# Patient Record
Sex: FEMALE | Race: WHITE | URBAN - METROPOLITAN AREA
[De-identification: names, ages, dates, MRNs, and addresses within clinical notes are randomized per-mention and may not be internally consistent; named-entity substitution may affect disease eponyms.]

---

## 2016-11-15 LAB
HPV ABSTRACT: NORMAL
PAP-ABSTRACT: NORMAL

## 2017-01-03 ENCOUNTER — OFFICE VISIT (OUTPATIENT)
Dept: OBGYN | Facility: CLINIC | Age: 32
End: 2017-01-03
Payer: COMMERCIAL

## 2017-01-03 VITALS
TEMPERATURE: 98 F | OXYGEN SATURATION: 99 % | DIASTOLIC BLOOD PRESSURE: 72 MMHG | HEIGHT: 70 IN | SYSTOLIC BLOOD PRESSURE: 110 MMHG | BODY MASS INDEX: 30.92 KG/M2 | WEIGHT: 216 LBS | HEART RATE: 74 BPM

## 2017-01-03 DIAGNOSIS — B97.7 HIGH RISK HPV INFECTION: Primary | ICD-10-CM

## 2017-01-03 DIAGNOSIS — Z11.51 SCREENING FOR HUMAN PAPILLOMAVIRUS: ICD-10-CM

## 2017-01-03 PROCEDURE — 87624 HPV HI-RISK TYP POOLED RSLT: CPT | Mod: 90 | Performed by: OBSTETRICS & GYNECOLOGY

## 2017-01-03 PROCEDURE — 88175 CYTOPATH C/V AUTO FLUID REDO: CPT | Mod: 90 | Performed by: OBSTETRICS & GYNECOLOGY

## 2017-01-03 PROCEDURE — 99203 OFFICE O/P NEW LOW 30 MIN: CPT | Performed by: OBSTETRICS & GYNECOLOGY

## 2017-01-03 PROCEDURE — 99000 SPECIMEN HANDLING OFFICE-LAB: CPT | Performed by: OBSTETRICS & GYNECOLOGY

## 2017-01-03 RX ORDER — ETONOGESTREL/ETHINYL ESTRADIOL .12-.015MG
RING, VAGINAL VAGINAL
COMMUNITY
Start: 2016-12-08

## 2017-01-03 NOTE — PATIENT INSTRUCTIONS
You can reach your [facility] Care Team any time of the day by calling [phone]. This number will put you in touch with the 24 hour nurse line if the clinic is closed.    To contact your OB/GYN Surgery Scheduler please call [phone]. This is a direct number for your care team between 8 a.m. and 4 p.m. Monday through Friday.    [facility] is open for your convenience: [phone]  Monday through Friday 8 a.m. to 8:30 p.m.  Saturday 9 a.m. to 6 p.m.  Sunday Noon to 6 p.m.    They are closed on all major holidays.

## 2017-01-03 NOTE — NURSING NOTE
"Chief Complaint   Patient presents with     Gyn Exam     pap the hpv        HPV positive    Initial /72 mmHg  Pulse 74  Temp(Src) 98  F (36.7  C) (Oral)  Ht 5' 10\" (1.778 m)  Wt 216 lb (97.977 kg)  BMI 30.99 kg/m2  SpO2 99%  LMP 2016 (Exact Date) Estimated body mass index is 30.99 kg/(m^2) as calculated from the following:    Height as of this encounter: 5' 10\" (1.778 m).    Weight as of this encounter: 216 lb (97.977 kg).  BP completed using cuff size: regular        The following HM Due: NONE      The following patient reported/Care Every where data was sent to:  P ABSTRACT QUALITY INITIATIVES [[id number]]       Ela Alonso CMA                  "

## 2017-01-03 NOTE — Clinical Note
[facility][1]  [address] [address]  [phone]      January 10, 2017    Davide Nova  [address] [place] [id number]    Dear Davide,  We are happy to inform you that your PAP smear result from 01/03/17 is normal.  We are now able to do a follow up test on PAP smears. The DNA test is for HPV (Human Papilloma Virus). Cervical cancer is closely linked with certain types of HPV. Your result showed no evidence of HPV.  Therefore we recommend you return in 1 year for your next pap smear.  You will still need to return to the clinic every year for an annual exam and other preventive tests.  Please contact the clinic with any questions.  Sincerely,  Krystin Flores MD/JORDYN RN

## 2017-01-03 NOTE — PROGRESS NOTES
"SUBJECTIVE:                                                   Davide Nova is a 31 year old G0 female who presents to clinic today for follow up of HPV on her last pap smear. She denies hx of abnormal pap smear but does report that she underwent colposcopy in 2014 for HPV, she believes it was NOT a high risk subtype. Unfortunately we do not have those records today. The records she brings with her show a normal pap smear on 11/30/16 with +HPV but no subtyping. She does not recall obtaining HPV vaccination. She is not a smoker. Not currently sexually active.    Problem list and histories reviewed & adjusted, as indicated.  Additional history: as documented.    Patient Active Problem List   Diagnosis     High risk HPV infection     Past Surgical History   Procedure Laterality Date     Hc tooth extraction w/forcep        Social History   Substance Use Topics     Smoking status: Never Smoker      Smokeless tobacco: Not on file     Alcohol Use: Yes      Problem (# of Occurrences) Relation (Name,Age of Onset)    Breast Cancer (1) Paternal Grandmother    DIABETES (1) Paternal Grandfather    Uterine Cancer (1) Mother            Current Outpatient Prescriptions   Medication     Cholecalciferol (VITAMIN D PO)     Probiotic Product (PROBIOTIC DAILY PO)     NUVARING 0.12-0.015 MG/24HR vaginal ring     No Known Allergies    OBJECTIVE:   /72 mmHg  Pulse 74  Temp(Src) 98  F (36.7  C) (Oral)  Ht 5' 10\" (1.778 m)  Wt 216 lb (97.977 kg)  BMI 30.99 kg/m2  SpO2 99%  LMP 12/21/2016 (Exact Date)   Gen: sitting in chair in no acute distress, comfortable  CV: regular rate, well perfused  Pulm: no increased work of breathing, no cough  Skin: warm and dry, no rashes/lesions  Psych: mood stable, appropriate affect  : External genitalia normal well-estrogenized, healthy tissue.  No obvious excoriations, lesions, or rashes. Bartholins, urethra, normal.  Normal moist pink vaginal mucosa.  SSE: Normal cervix, normal physiologic " discharge. Pap smear collected.      ASSESSMENT/PLAN:                                                    Davide Nova is a 31 year old female  here for work up of +HPV testing at her last pap smear. She appears to have a history of HPV, unfortunately I do not have adequate records to meet criteria for colposcopy today. Following discussion of HPV and ASCCP guidelines we agree to proceed with diagnostic pap and HPV typing. If she is + for 16 or 18 will proceed with colposcopy. If her records from 2014 indicate high risk HPV then will plan colposcopy for persistent HR HPV. However, if colposcopy and pap from 2014 indicate non-high risk strain will plan to repeat pap smear in 1 year.      Today I spent 30 minutes with the patient, of which 25 minutes was spent reviewing HPV and cervical cancer.       Krystin Flores MD  Obstetrics and Gynecology   [facility]

## 2017-01-05 LAB
COPATH REPORT: NORMAL
PAP: NORMAL

## 2017-01-09 LAB
FINAL DIAGNOSIS: NORMAL
HPV HR 12 DNA CVX QL NAA+PROBE: NEGATIVE
HPV16 DNA SPEC QL NAA+PROBE: NEGATIVE
HPV18 DNA SPEC QL NAA+PROBE: NEGATIVE
SPECIMEN DESCRIPTION: NORMAL

## 2017-01-10 ENCOUNTER — RESULT FOLLOW UP (OUTPATIENT)
Dept: OBGYN | Facility: CLINIC | Age: 32
End: 2017-01-10

## 2017-01-10 DIAGNOSIS — B97.7 HIGH RISK HPV INFECTION: Primary | ICD-10-CM

## 2017-01-10 NOTE — LETTER
April 26, 2018      Davide Nova  [address] [place] [id number]    Dear ,      This letter is to remind you that you were due for your follow up PAP smear and HPV test on or about 01/03/18.    Please call [phone] to schedule your appointment at your earliest convenience.     If you have completed the tests outside of [facility], please have the results forwarded to our office. We will update the chart for your primary Physician to review before your next annual physical.     Sincerely,      Krystin Flores MD/[facility]

## 2017-01-10 NOTE — PROGRESS NOTES
01/03/17 NL pap, neg HPV, F/U from previous NL pap +HPV HR, (no subtype), and colp that was done in 2014-done elsewhere. Pt. Is to have a repeat pap and HPV screening in one year.   04/26/18 Pap reminder letter sent. ([facility])  5/17/18 Reminder call placed, spoke to pt who asked if it was still needed if she is not sexually active, I said yes and explained that only some are sexually transmitted, she will be double checking with new insurance and calling to schedule ([facility])  06/14/18 Patient is lost to pap tracking follow-up. FYI routed to provider. ([facility])

## 2018-05-17 ENCOUNTER — TELEPHONE (OUTPATIENT)
Dept: OBGYN | Facility: CLINIC | Age: 33
End: 2018-05-17

## 2018-05-17 NOTE — TELEPHONE ENCOUNTER
Pt is past due for fu pap smear and HPV test  Reminder letter was sent 4/26/18  Spoke to pt, she will be checking with insurance and calling to schedule  If no reply and/or appt within two weeks (5/31/18) patient will be considered lost to pap tracking f/u.  Carly Kapadia,   Pap Tracking